# Patient Record
Sex: MALE | Race: BLACK OR AFRICAN AMERICAN | Employment: FULL TIME | URBAN - METROPOLITAN AREA
[De-identification: names, ages, dates, MRNs, and addresses within clinical notes are randomized per-mention and may not be internally consistent; named-entity substitution may affect disease eponyms.]

---

## 2017-01-30 DIAGNOSIS — K21.9 GASTROESOPHAGEAL REFLUX DISEASE WITHOUT ESOPHAGITIS: Primary | ICD-10-CM

## 2017-01-30 DIAGNOSIS — I10 HTN (HYPERTENSION): ICD-10-CM

## 2017-01-30 DIAGNOSIS — E11.9 DIABETES MELLITUS, TYPE 2 (H): Primary | ICD-10-CM

## 2017-01-30 DIAGNOSIS — E78.5 HYPERLIPEMIA: ICD-10-CM

## 2017-01-30 RX ORDER — ASPIRIN 325 MG
325 TABLET ORAL DAILY
Qty: 90 TABLET | Refills: 1 | Status: SHIPPED | OUTPATIENT
Start: 2017-01-30 | End: 2017-10-09

## 2017-01-30 NOTE — TELEPHONE ENCOUNTER
aspirin 325 MG tablet      Last Written Prescription Date:  Historical - patient report takes 1 daily     Last Office Visit : 7/5/2016  Future Office visit:  Not scheduled    Routing refill request to provider for review/approval because:  Medication is reported/historical and dose higher than standing order medication protocol

## 2017-01-30 NOTE — TELEPHONE ENCOUNTER
ranitidine (ZANTAC) 150 MG tablet      Last Written Prescription Date:  5/31/2016  Last Fill Quantity: 180,   # refills: 3  Last Office Visit : 7/5/2016  Future Office visit:  Not scheduled

## 2017-03-30 DIAGNOSIS — E78.5 HYPERLIPEMIA: ICD-10-CM

## 2017-03-30 DIAGNOSIS — K21.9 GASTROESOPHAGEAL REFLUX DISEASE WITHOUT ESOPHAGITIS: ICD-10-CM

## 2017-03-30 RX ORDER — SIMVASTATIN 40 MG
40 TABLET ORAL AT BEDTIME
Qty: 90 TABLET | Refills: 1 | Status: SHIPPED | OUTPATIENT
Start: 2017-03-30 | End: 2017-09-21

## 2017-03-30 NOTE — TELEPHONE ENCOUNTER
simvastatin  Last Written Prescription Date:  3/15/16  Last Fill Quantity: 90,   # refills: 3  Last Office Visit : 7/5/16  Future Office visit:  no

## 2017-05-15 ENCOUNTER — OFFICE VISIT (OUTPATIENT)
Dept: ENDOCRINOLOGY | Facility: CLINIC | Age: 67
End: 2017-05-15

## 2017-05-15 VITALS — HEIGHT: 70 IN

## 2017-05-15 DIAGNOSIS — E11.40 TYPE 2 DIABETES MELLITUS WITH DIABETIC NEUROPATHY, WITH LONG-TERM CURRENT USE OF INSULIN (H): Primary | ICD-10-CM

## 2017-05-15 DIAGNOSIS — Z79.4 TYPE 2 DIABETES MELLITUS WITH DIABETIC NEUROPATHY, WITH LONG-TERM CURRENT USE OF INSULIN (H): Primary | ICD-10-CM

## 2017-05-15 LAB — HBA1C MFR BLD: 8.5 % (ref 4.3–6)

## 2017-05-15 ASSESSMENT — PAIN SCALES - GENERAL: PAINLEVEL: NO PAIN (0)

## 2017-05-15 NOTE — PROGRESS NOTES
HPI:   Mr. Moore is here for follow up of type 2 diabetes.  He also has the problems listed below.  He usually sees Allie Jensen.  At his last visit with Alicia, he was struggling to afford his medications (bydureon and lantus).  She switched him to NPH and he is currently taking NPH 10 units in the morning (he has not been taking his evening dose) and regular insulin 4 units with each meal, but he is forgetting to take it at lunch often. He thinks it is working well and his blood sugars have been better.  He usually eats 2 toasts, eggs, small glass OJ.  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  He likes doing yardwork. He otherwise has no concerns today.    Past Medical History:   Diagnosis Date     Diabetes mellitus      Hypertension      Myocardial infarct (H)      PMH:   Type 2 diabetes since 2004.  HTN-   MI- in 1980's    No past surgical history on file.    Family History   Problem Relation Age of Onset     C.A.D. Mother      HEART DISEASE Mother      C.A.D. Father      DIABETES Father      Thyroid Disease Father      DIABETES Brother      C.A.D. Sister      HEART DISEASE Sister      Obesity Sister      Alcohol/Drug Brother      Circulatory Brother        Social History     Social History     Marital status:      Spouse name: N/A     Number of children: N/A     Years of education: N/A     Social History Main Topics     Smoking status: Former Smoker     Packs/day: 1.00     Years: 25.00     Types: Cigarettes     Quit date: 12/21/2004     Smokeless tobacco: Never Used     Alcohol use No     Drug use: No     Sexual activity: Yes     Partners: Female     Birth control/ protection: None     Other Topics Concern     Parent/Sibling W/ Cabg, Mi Or Angioplasty Before 65f 55m? Yes     Social History Narrative   Social Hx:  , no kids.  Works at [employer].     Current Outpatient Prescriptions   Medication     simvastatin (ZOCOR) 40 MG tablet     ranitidine (ZANTAC) 150 MG tablet  "    aspirin 325 MG tablet     hydrochlorothiazide (HYDRODIURIL) 25 MG tablet     insulin regular (HUMULIN R, NOVOLIN R) 100 UNIT/ML VIAL     insulin NPH (HUMULIN N, NOVOLIN N) 100 UNIT/ML VIAL     lisinopril (PRINIVIL,ZESTRIL) 20 MG tablet     ONE TOUCH ULTRA test strip     insulin pen needle (ULTICARE SHORT PEN NEEDLES) 31G X 8 MM MISC     No current facility-administered medications for this visit.         No Known Allergies    Physical Exam  BP (P) 132/90  Pulse (P) 92  Ht 1.778 m (5' 10\")  Wt (P) 93.4 kg (206 lb)  BMI (P) 29.56 kg/m2  GENERAL:  Alert and oriented X3, NAD, well dressed, answering questions appropriately, appears stated age.  EXTREMITIES: no edema, +pulses, no rashes, no lesions  RESULTS  Lab Results   Component Value Date    A1C 6.7 07/31/2013    A1C 7.0 04/30/2013    A1C 8.7 12/11/2012    A1C 13.4 09/25/2012    A1C 12.0 01/04/2011       TSH   Date Value Ref Range Status   11/25/2014 1.02 0.40 - 4.00 mU/L Final     Comment:     Effective 7/30/2014, the reference range for this assay has changed to reflect   new instrumentation/methodology.     09/27/2013 0.41 0.4 - 5.0 mU/L Final   03/28/2012 1.09 0.4 - 5.0 mU/L Final   01/04/2011 1.82 0.4 - 5.0 mU/L Final   01/04/2011 1.82 mcU/mL      T4 Free   Date Value Ref Range Status   11/25/2014 1.21 0.76 - 1.46 ng/dL Final     Comment:     Effective 7/30/2014, the reference range for this assay has changed to reflect   new instrumentation/methodology.     09/27/2013 0.92 0.70 - 1.85 ng/dL Final   03/28/2012 1.16 0.70 - 1.85 ng/dL Final   01/04/2011 1.09 0.70 - 1.85 ng/dL Final   09/01/2009 0.86 0.70 - 1.85 ng/dL Final       Creatinine   Date Value Ref Range Status   08/11/2015 1.51 (H) 0.66 - 1.25 mg/dL Final   04/16/2015 1.28 (H) 0.66 - 1.25 mg/dL Final   ]    No results found for: ALBUMIN]    ALT   Date Value Ref Range Status   11/25/2014 19 0 - 70 U/L Final   09/27/2013 21 0 - 70 U/L Final   ]    Recent Labs   Lab Test  04/16/15   0932  09/27/13   " 1101   CHOL  138  129   HDL  45  29*   LDL  69  68   TRIG  124  164*   CHOLHDLRATIO  3.1  4.5       No results found for: B12]    Assessment/Plan:     1.  Type 2 diabetes- Overall, doing quite well.  A1c has come down to 8.6%, from 12%.  Will make the following changes (instructions given to patient):     Take NPH (cloudy insulin) 10 units in the morning and 5 units at night.      Take Regular insulin (mealtime insulin) 4 units 30 minutes before eating.     Goal is to have most readings under 180.      Please call if you continue seeing numbers over 250 mg/dL.      A1c is down to 8.6%!    2.  Risk factors-     Retinopathy:  No.  Had eye exam within last year.   Nephropathy:  BP well controlled. No microalbuminuria.  Creatinine stable.   Neuropathy: No.    Feet: OK, no ulcers.   Taking ASA:   Lipids:  LDL at target.  Patient taking statin    3.  F/U in 3 months as scheduled with Alicia Jensen.     25/30 minute visit was spent counseling patient.     Tamara Garcia PA-C, MPAS   [facility]  Department of Medicine  Division of Endocrinology and Diabetes

## 2017-05-15 NOTE — MR AVS SNAPSHOT
After Visit Summary   5/15/2017    Harris Moore    MRN: [id number]           Patient Information     Date Of Birth          1950        Visit Information        Provider Department      5/15/2017 12:00 PM Tamara Garcia PA-C [facility]        Care Instructions    Take NPH (cloudy insulin) 10 units in the morning and 5 units at night.      Take Regular insulin (mealtime insulin) 4 units 30 minutes before eating.     Goal is to have most readings under 180.      Please call if you continue seeing numbers over 250 mg/dL.      A1c is down to 8.6%!!!            Follow-ups after your visit        Follow-up notes from your care team     Return in about 3 months (around 8/15/2017).      Your next 10 appointments already scheduled     Aug 21, 2017  1:00 PM CDT   (Arrive by 12:45 PM)   RETURN DIABETES with NORMA Noyola [facility] ([facility])    [address] [place] [id number] [phone]              Who to contact     Please call your clinic at [phone] to:    Ask questions about your health    Make or cancel appointments    Discuss your medicines    Learn about your test results    Speak to your doctor   If you have compliments or concerns about an experience at your clinic, or if you wish to file a complaint, please contact [facility] Patient Relations at [phone] or email us at [email or web address]         Additional Information About Your Visit        MyChart Information     [employer] is an electronic gateway that provides easy, online access to your medical records. With [employer], you can request a clinic appointment, read your test results, renew a prescription or communicate with your care team.     To sign up for [employer] visit the website at [email or web address]   You will be asked to enter the access code listed below, as well as some personal  "information. Please follow the directions to create your username and password.     Your access code is: [email or web address]  Expires: 2017 12:50 PM     Your access code will  in 90 days. If you need help or a new code, please contact your [facility] or call [phone] for assistance.        Care EveryWhere ID     This is your Care EveryWhere ID. This could be used by other organizations to access your [facility] medical records  [id number]        Your Vitals Were     Height                   1.778 m (5' 10\")            Blood Pressure from Last 3 Encounters:   05/15/17 (P) 132/90   16 111/70   02/10/16 120/85    Weight from Last 3 Encounters:   05/15/17 (P) 93.4 kg (206 lb)   16 86.1 kg (189 lb 12.8 oz)   02/10/16 92.3 kg (203 lb 6.4 oz)              Today, you had the following     No orders found for display       Primary Care Provider Office Phone # Fax #    Sally Colby PA-C [phone]        [facility] [address] [place] [id number]        Thank you!     Thank you for choosing [facility]  for your care. Our goal is always to provide you with excellent care. Hearing back from our patients is one way we can continue to improve our services. Please take a few minutes to complete the written survey that you may receive in the mail after your visit with us. Thank you!             Your Updated Medication List - Protect others around you: Learn how to safely use, store and throw away your medicines at www.disposemymeds.org.          This list is accurate as of: 5/15/17 12:50 PM.  Always use your most recent med list.                   Brand Name Dispense Instructions for use    aspirin 325 MG tablet     90 tablet    Take 1 tablet (325 mg) by mouth daily       hydrochlorothiazide 25 MG tablet    HYDRODIURIL    90 tablet    Take 1 tablet (25 mg) by mouth daily       insulin  UNIT/ML injection    HumuLIN " N/NovoLIN N    10 mL    10 units before breakfast and again before bedtime.       insulin pen needle 31G X 8 MM    ULTICARE SHORT    360 each    Use 4 daily or as directed.       insulin regular 100 UNIT/ML injection    HumuLIN R/NovoLIN R    10 mL    4 units before each meal       lisinopril 20 MG tablet    PRINIVIL/ZESTRIL    90 tablet    Take 1 tablet (20 mg) by mouth daily       ONE TOUCH ULTRA test strip   Generic drug:  blood glucose monitoring     4 Box    Test 4 times daily       ranitidine 150 MG tablet    ZANTAC    180 tablet    Take 1 tablet (150 mg) by mouth 2 times daily       simvastatin 40 MG tablet    ZOCOR    90 tablet    Take 1 tablet (40 mg) by mouth At Bedtime

## 2017-05-15 NOTE — NURSING NOTE
Chief Complaint   Patient presents with     RECHECK     F/U TYPE II DM     Zaida Banerjee, CMA  Endocrinology & Diabetes 3G    Capillary Fingerstick performed for an Hemoglobin A1C test

## 2017-05-15 NOTE — LETTER
5/15/2017       RE: Harris Moore  [address] [place] [address]     Dear Colleague,    Thank you for referring your patient, Harris Moore, to the [facility] at [facility]. Please see a copy of my visit note below.    HPI:   Mr. Moore is here for follow up of type 2 diabetes.  He also has the problems listed below.  He usually sees lAlie Jensen.  At his last visit with Alicia, he was struggling to afford his medications (bydureon and lantus).  She switched him to NPH and he is currently taking NPH 10 units in the morning (he has not been taking his evening dose) and regular insulin 4 units with each meal, but he is forgetting to take it at lunch often. He thinks it is working well and his blood sugars have been better.  He usually eats 2 toasts, eggs, small glass OJ.  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 He otherwise has no concerns today.    Past Medical History:   Diagnosis Date     Diabetes mellitus      Hypertension      Myocardial infarct (H)      PMH:   Type 2 diabetes since 2004.  HTN-   MI- in 1980's    No past surgical history on file.    Family History   Problem Relation Age of Onset     C.A.D. Mother      HEART DISEASE Mother      C.A.D. Father      DIABETES Father      Thyroid Disease Father      DIABETES Brother      C.A.D. Sister      HEART DISEASE Sister      Obesity Sister      Alcohol/Drug Brother      Circulatory Brother        Social History     Social History     Marital status:      Spouse name: N/A     Number of children: N/A     Years of education: N/A     Social History Main Topics     Smoking status: Former Smoker     Packs/day: 1.00     Years: 25.00     Types: Cigarettes     Quit date: 12/21/2004     Smokeless tobacco: Never Used     Alcohol use No     Drug use: No     Sexual activity: Yes     Partners: Female     Birth control/ protection: None     Other Topics  "Concern     Parent/Sibling W/ Cabg, Mi Or Angioplasty Before 65f 55m? Yes     Social History Narrative   Social Hx:  , no kids.  Works at [employer].     Current Outpatient Prescriptions   Medication     simvastatin (ZOCOR) 40 MG tablet     ranitidine (ZANTAC) 150 MG tablet     aspirin 325 MG tablet     hydrochlorothiazide (HYDRODIURIL) 25 MG tablet     insulin regular (HUMULIN R, NOVOLIN R) 100 UNIT/ML VIAL     insulin NPH (HUMULIN N, NOVOLIN N) 100 UNIT/ML VIAL     lisinopril (PRINIVIL,ZESTRIL) 20 MG tablet     ONE TOUCH ULTRA test strip     insulin pen needle (ULTICARE SHORT PEN NEEDLES) 31G X 8 MM MISC     No current facility-administered medications for this visit.         No Known Allergies    Physical Exam  BP (P) 132/90  Pulse (P) 92  Ht 1.778 m (5' 10\")  Wt (P) 93.4 kg (206 lb)  BMI (P) 29.56 kg/m2  GENERAL:  Alert and oriented X3, NAD, well dressed, answering questions appropriately, appears stated age.  EXTREMITIES: no edema, +pulses, no rashes, no lesions  RESULTS  Lab Results   Component Value Date    A1C 6.7 07/31/2013    A1C 7.0 04/30/2013    A1C 8.7 12/11/2012    A1C 13.4 09/25/2012    A1C 12.0 01/04/2011       TSH   Date Value Ref Range Status   11/25/2014 1.02 0.40 - 4.00 mU/L Final     Comment:     Effective 7/30/2014, the reference range for this assay has changed to reflect   new instrumentation/methodology.     09/27/2013 0.41 0.4 - 5.0 mU/L Final   03/28/2012 1.09 0.4 - 5.0 mU/L Final   01/04/2011 1.82 0.4 - 5.0 mU/L Final   01/04/2011 1.82 mcU/mL      T4 Free   Date Value Ref Range Status   11/25/2014 1.21 0.76 - 1.46 ng/dL Final     Comment:     Effective 7/30/2014, the reference range for this assay has changed to reflect   new instrumentation/methodology.     09/27/2013 0.92 0.70 - 1.85 ng/dL Final   03/28/2012 1.16 0.70 - 1.85 ng/dL Final   01/04/2011 1.09 0.70 - 1.85 ng/dL Final   09/01/2009 0.86 0.70 - 1.85 ng/dL Final       Creatinine   Date Value Ref Range Status "   08/11/2015 1.51 (H) 0.66 - 1.25 mg/dL Final   04/16/2015 1.28 (H) 0.66 - 1.25 mg/dL Final   ]    No results found for: ALBUMIN]    ALT   Date Value Ref Range Status   11/25/2014 19 0 - 70 U/L Final   09/27/2013 21 0 - 70 U/L Final   ]    Recent Labs   Lab Test  04/16/15   0932  09/27/13   1101   CHOL  138  129   HDL  45  29*   LDL  69  68   TRIG  124  164*   CHOLHDLRATIO  3.1  4.5       No results found for: B12]    Assessment/Plan:     1.  Type 2 diabetes- Overall, doing quite well.  A1c has come down to 8.6%, from 12%.  Will make the following changes (instructions given to patient):     Take NPH (cloudy insulin) 10 units in the morning and 5 units at night.      Take Regular insulin (mealtime insulin) 4 units 30 minutes before eating.     Goal is to have most readings under 180.      Please call if you continue seeing numbers over 250 mg/dL.      A1c is down to 8.6%!    2.  Risk factors-     Retinopathy:  No.  Had eye exam within last year.   Nephropathy:  BP well controlled. No microalbuminuria.  Creatinine stable.   Neuropathy: No.    Feet: OK, no ulcers.   Taking ASA:   Lipids:  LDL at target.  Patient taking statin    3.  F/U in 3 months as scheduled with Alicia Jensen.     25/30 minute visit was spent counseling patient.     Tamara Garcia PA-C, MPAS   [facility]  Department of Medicine  Division of Endocrinology and Diabetes

## 2017-05-23 ENCOUNTER — TELEPHONE (OUTPATIENT)
Dept: ENDOCRINOLOGY | Facility: CLINIC | Age: 67
End: 2017-05-23

## 2017-05-23 NOTE — TELEPHONE ENCOUNTER
Pt at [facility] ED over weekend, kept for observation after lip/face swelling, no trouble breathing/swallowing. Given Epipen, steroid shot. Told had angioedema, could have been caused by lisinopril, aspirin. Please advise at [phone]. DVM fine. Sent to [facility].

## 2017-05-26 NOTE — TELEPHONE ENCOUNTER
Spoke with spouse and relayd provider message below, verbalized understandings,        ----- Message -----     From: Allie Jensen PA-C     Sent: 5/23/2017  11:48 AM       To: Elena Zelaya RN  Subject: RE: Pt in hospital over the weekend              He should hold both meds Lisinopril and ASA for now and check his BP at home and call with BP readings in a few days.  He can remain on HCTZ.  Thanks adilson   ----- Message -----     From: Elena Zelaya RN     Sent: 5/23/2017  10:19 AM       To: Allie Jensen PA-C  Subject: Pt in hospital over the weekend                  Pt at [facility] ED over weekend, kept for observation after lip/face swelling, no trouble breathing/swallowing. Given Epipen, steroid shot. Told had angioedema, could have been caused by lisinopril, aspirin. Please advise at [phone]. DVM roym  Thanks,   Elena ESCOBEDO

## 2017-07-07 DIAGNOSIS — K21.9 GASTROESOPHAGEAL REFLUX DISEASE WITHOUT ESOPHAGITIS: ICD-10-CM

## 2017-07-10 NOTE — TELEPHONE ENCOUNTER
ranitidine        Last Written Prescription Date:  1/30/17  Last Fill Quantity: 180,   # refills: 1  Last Office Visit : 5/15/17  Future Office visit:  8/21/17

## 2017-07-12 DIAGNOSIS — E11.9 DM TYPE 2 (DIABETES MELLITUS, TYPE 2) (H): ICD-10-CM

## 2017-07-12 DIAGNOSIS — I10 HYPERTENSION GOAL BP (BLOOD PRESSURE) < 140/90: Primary | ICD-10-CM

## 2017-07-12 RX ORDER — LISINOPRIL 20 MG/1
20 TABLET ORAL DAILY
Qty: 90 TABLET | Refills: 3 | Status: SHIPPED | OUTPATIENT
Start: 2017-07-12 | End: 2017-10-09

## 2017-07-12 NOTE — TELEPHONE ENCOUNTER
lisinopril (PRINIVIL,ZESTRIL) 20 MG tablet      Last Written Prescription Date:  4/11/2016  Last Fill Quantity: 90,   # refills: 3  Last Office Visit : 5/15/2017  Future Office visit:  8/21/2017

## 2017-09-21 DIAGNOSIS — E78.5 HYPERLIPEMIA: ICD-10-CM

## 2017-09-22 RX ORDER — SIMVASTATIN 40 MG
40 TABLET ORAL AT BEDTIME
Qty: 90 TABLET | Refills: 3 | Status: SHIPPED | OUTPATIENT
Start: 2017-09-22

## 2017-09-22 NOTE — TELEPHONE ENCOUNTER
simvastatin  40 MG       Last Written Prescription Date:  3/30/17  Last Fill Quantity: 90,   # refills: 1  Last Office Visit : 5/15/17  Future Office visit:  10/9/17     }

## 2017-09-23 DIAGNOSIS — E11.9 TYPE 2 DIABETES MELLITUS WITHOUT COMPLICATIONS (H): ICD-10-CM

## 2017-09-23 NOTE — TELEPHONE ENCOUNTER
"Received refill request for Novolin N, inject 10 units SQ before breakfast and bedtime (last filled by Allie Jensen).    Last office visit with Tamara Garcia on 5/15/17. Upcoming office visit with Tamara on 10/9/17.          Per 5/15/17 progress note from Tamara Garcia:    \"1. Type 2 diabetes- Overall, doing quite well.  A1c has come down to 8.6%, from 12%.  Will make the following changes (instructions given to patient):      Take NPH (cloudy insulin) 10 units in the morning and 5 units at night.       Take Regular insulin (mealtime insulin) 4 units 30 minutes before eating.\"        In Tamara's absence will send to Allie Jensen for change in prescription dosing.       Kalani Bonner, RN  Endocrine Care Coordinator  [facility]     "

## 2017-09-25 DIAGNOSIS — I10 HTN (HYPERTENSION): Primary | ICD-10-CM

## 2017-09-25 RX ORDER — HYDROCHLOROTHIAZIDE 25 MG/1
25 TABLET ORAL DAILY
Qty: 90 TABLET | Refills: 2 | Status: SHIPPED | OUTPATIENT
Start: 2017-09-25 | End: 2018-09-03

## 2017-09-25 NOTE — TELEPHONE ENCOUNTER
dominiciuril  Last Written Prescription Date:  12/5/16  Last Fill Quantity: 90,   # refills: 2  Last Office Visit : 5/15/17  Future Office visit:  10/9/17

## 2017-09-28 DIAGNOSIS — E11.9 TYPE 2 DIABETES MELLITUS WITHOUT COMPLICATIONS (H): ICD-10-CM

## 2017-10-09 ENCOUNTER — OFFICE VISIT (OUTPATIENT)
Dept: ENDOCRINOLOGY | Facility: CLINIC | Age: 67
End: 2017-10-09

## 2017-10-09 VITALS — HEIGHT: 70 IN

## 2017-10-09 DIAGNOSIS — E11.65 POORLY CONTROLLED TYPE 2 DIABETES MELLITUS (H): ICD-10-CM

## 2017-10-09 DIAGNOSIS — E11.65 POORLY CONTROLLED TYPE 2 DIABETES MELLITUS (H): Primary | ICD-10-CM

## 2017-10-09 LAB
CREAT SERPL-MCNC: 1.22 MG/DL (ref 0.66–1.25)
CREAT UR-MCNC: 200 MG/DL
GFR SERPL CREATININE-BSD FRML MDRD: 59 ML/MIN/1.7M2
HBA1C MFR BLD: 8.3 % (ref 4.3–6)
MICROALBUMIN UR-MCNC: 36 MG/L
MICROALBUMIN/CREAT UR: 17.8 MG/G CR (ref 0–17)
VIT B12 SERPL-MCNC: 413 PG/ML (ref 193–986)

## 2017-10-09 ASSESSMENT — PAIN SCALES - GENERAL: PAINLEVEL: NO PAIN (0)

## 2017-10-09 NOTE — PATIENT INSTRUCTIONS
Take NPH (cloudy insulin) 10 units in the morning and 8 units at night.      A1c is down to 8.3%!  Keep up the great work!    I will get in touch with you about your labs. We may be able to restart Metformin.     Let me know if you get the cortisone shot and we will adjust your insulin.

## 2017-10-09 NOTE — LETTER
10/9/2017       RE: Harris Moore  [address]  [place] [address]     Dear Colleague,    Thank you for referring your patient, Harris Moore, to the [facility] ENDOCRINOLOGY at [facility]. Please see a copy of my visit note below.    HPI:   Mr. Moore is here for follow up of type 2 diabetes.  He also has the problems listed below.  He also has seen Allie Jensen in the past.  He previously had been struggling to afford his medications (bydureon and lantus), so he was switched to NPH and he is currently taking NPH 10 units in the morning and 5 in the PM, Regular insulin 4-5 units with each meal (twice a day, 3 times a week).  He thinks it is working well and his blood sugars have been better.  He usually eats 2 toasts, eggs, small glass OJ.  He works at the [employer] casually. 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  He does yardwork.    His right knee has been painful.  He is considering a cortisone shot. He otherwise has no concerns today.    Past Medical History:   Diagnosis Date     Diabetes mellitus      Hypertension      Myocardial infarct      PMH:   Type 2 diabetes since 2004.  HTN-   MI- in 1980's    No past surgical history on file.    Family History   Problem Relation Age of Onset     C.A.D. Mother      HEART DISEASE Mother      C.A.D. Father      DIABETES Father      Thyroid Disease Father      DIABETES Brother      C.A.D. Sister      HEART DISEASE Sister      Obesity Sister      Alcohol/Drug Brother      Circulatory Brother        Social History     Social History     Marital status:      Spouse name: N/A     Number of children: N/A     Years of education: N/A     Social History Main Topics     Smoking status: Former Smoker     Packs/day: 1.00     Years: 25.00     Types: Cigarettes     Quit date: 12/21/2004     Smokeless tobacco: Never Used     Alcohol use No     Drug use: No     Sexual activity: Yes     Partners:  "Female     Birth control/ protection: None     Other Topics Concern     Parent/Sibling W/ Cabg, Mi Or Angioplasty Before 65f 55m? Yes     Social History Narrative   Social Hx:  , no kids.  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     Current Outpatient Prescriptions   Medication     Acetaminophen 325 MG CAPS     insulin NPH (HUMULIN N/NOVOLIN N) 100 UNIT/ML injection     insulin regular (HUMULIN R/NOVOLIN R) 100 UNIT/ML injection     hydrochlorothiazide (HYDRODIURIL) 25 MG tablet     simvastatin (ZOCOR) 40 MG tablet     ranitidine (ZANTAC) 150 MG tablet     ONE TOUCH ULTRA test strip     insulin pen needle (ULTICARE SHORT PEN NEEDLES) 31G X 8 MM MISC     No current facility-administered medications for this visit.         No Known Allergies    Physical Exam  BP (!) (P) 135/96  Pulse (P) 96  Ht 1.778 m (5' 10\")  Wt (P) 93.4 kg (206 lb)  BMI (P) 29.56 kg/m2  GENERAL:  Alert and oriented X3, NAD, well dressed, answering questions appropriately, appears stated age.  EXTREMITIES: no edema, +pulses, no rashes, no lesions    RESULTS  Lab Results   Component Value Date    A1C 6.7 07/31/2013    A1C 7.0 04/30/2013    A1C 8.7 12/11/2012    A1C 13.4 09/25/2012    A1C 12.0 01/04/2011       TSH   Date Value Ref Range Status   11/25/2014 1.02 0.40 - 4.00 mU/L Final     Comment:     Effective 7/30/2014, the reference range for this assay has changed to reflect   new instrumentation/methodology.     09/27/2013 0.41 0.4 - 5.0 mU/L Final   03/28/2012 1.09 0.4 - 5.0 mU/L Final   01/04/2011 1.82 0.4 - 5.0 mU/L Final   01/04/2011 1.82 mcU/mL      T4 Free   Date Value Ref Range Status   11/25/2014 1.21 0.76 - 1.46 ng/dL Final     Comment:     Effective 7/30/2014, the reference range for this assay has changed to reflect   new instrumentation/methodology.     09/27/2013 0.92 0.70 - 1.85 ng/dL Final   03/28/2012 1.16 0.70 - 1.85 ng/dL Final   01/04/2011 1.09 0.70 - 1.85 ng/dL Final   09/01/2009 0.86 0.70 - 1.85 ng/dL Final "       Creatinine   Date Value Ref Range Status   08/11/2015 1.51 (H) 0.66 - 1.25 mg/dL Final   04/16/2015 1.28 (H) 0.66 - 1.25 mg/dL Final   ]    No results found for: ALBUMIN]    ALT   Date Value Ref Range Status   11/25/2014 19 0 - 70 U/L Final   09/27/2013 21 0 - 70 U/L Final   ]    Recent Labs   Lab Test  04/16/15   0932  09/27/13   1101   CHOL  138  129   HDL  45  29*   LDL  69  68   TRIG  124  164*   CHOLHDLRATIO  3.1  4.5       No results found for: B12]      Assessment/Plan:     1.  Type 2 diabetes- Overall, doing quite well.  A1c has come down to 8.3% from 8.6%, from 12%.  Will check creatinine.  If ok, will plan to restart Metformin.  In the meantime, make the following changes (instructions given to patient):     Take NPH (cloudy insulin) 10 units in the morning and 8 units at night.      2.  Risk factors-     Retinopathy:  No.  Had eye exam within last year.   Nephropathy:  BP well controlled. No microalbuminuria.  Creatinine stable.  Has a history of angioedema with lisinopril.  If needed in the future, will use an ARB.   Neuropathy: Some numbness in feet.     Feet: OK, no ulcers.   Taking ASA: Yes  Lipids:  LDL at target.  Patient taking statin    3.  F/U in 3 months.     I spent 30 minutes with this patient face to face and explained the conditions and plans (more than 50% of time was counseling/coordination of care, diabetes management, follow up plan for worsening hyper and hypoglycemia) . The patient understood and is satisfied with today's visit.       Tamara Garcia PA-C, MPAS   [facility]  Department of Medicine  Division of Endocrinology and Diabetes      10:40am- creatinine came back normal, so will restart Metformin 500 mg once daily x 1 week, then increase to 500 mg bid.  Send patient a [employer] message.   Tamara Garcia PA-C

## 2017-10-09 NOTE — MR AVS SNAPSHOT
After Visit Summary   10/9/2017    Harris Moore    MRN: [id number]           Patient Information     Date Of Birth          1950        Visit Information        Provider Department      10/9/2017 8:00 AM Tamara Garcia PA-C [facility]        Today's Diagnoses     Poorly controlled type 2 diabetes mellitus (H)    -  1      Care Instructions    Take NPH (cloudy insulin) 10 units in the morning and 8 units at night.      A1c is down to 8.3%!  Keep up the great work!    I will get in touch with you about your labs. We may be able to restart Metformin.     Let me know if you get the cortisone shot and we will adjust your insulin.            Follow-ups after your visit        Follow-up notes from your care team     Return in about 3 months (around 1/9/2018).      Your next 10 appointments already scheduled     Oct 09, 2017  9:45 AM CDT   LAB with  LAB   [facility] ([facility])    [address] [place] [id number] [phone]           Patient must bring picture ID. Patient should be prepared to give a urine specimen  Please do not eat 10-12 hours before your appointment if you are coming in fasting for labs on lipids, cholesterol, or glucose (sugar). Pregnant women should follow their Care Team instructions. Water with medications is okay. Do not drink coffee or other fluids. If you have concerns about taking  your medications, please ask at office or if scheduling via [facility], send a message by clicking on Secure Messaging, Message Your Care Team.            Jan 08, 2018  9:30 AM CST   (Arrive by 9:15 AM)   RETURN DIABETES with NORMA Noyola [facility] ([facility])    [address] [place] [id number] [phone]              Future tests that were ordered for you today     Open Future Orders        Priority Expected Expires Ordered    Vitamin B12 Routine   "10/10/2018 10/9/2017    Creatinine Routine  10/9/2018 10/9/2017            Who to contact     Please call your clinic at [phone] to:    Ask questions about your health    Make or cancel appointments    Discuss your medicines    Learn about your test results    Speak to your doctor   If you have compliments or concerns about an experience at your clinic, or if you wish to file a complaint, please contact [facility] Patient Relations at [phone] or email us at [email or web address]         Additional Information About Your Visit        [employer] Information     [employer] is an electronic gateway that provides easy, online access to your medical records. With [employer], you can request a clinic appointment, read your test results, renew a prescription or communicate with your care team.     To sign up for [employer] visit the website at [email or web address]   You will be asked to enter the access code listed below, as well as some personal information. Please follow the directions to create your username and password.     Your access code is: [email or web address]  Expires: 2018  8:54 AM     Your access code will  in 90 days. If you need help or a new code, please contact your [facility] or call [phone] for assistance.        Care EveryWhere ID     This is your Care EveryWhere ID. This could be used by other organizations to access your [place] medical records  [id number]        Your Vitals Were     Height                   1.778 m (5' 10\")            Blood Pressure from Last 3 Encounters:   10/09/17 (!) (P) 135/96   05/15/17 (P) 132/90   16 111/70    Weight from Last 3 Encounters:   10/09/17 (P) 93.4 kg (206 lb)   05/15/17 (P) 93.4 kg (206 lb)   16 86.1 kg (189 lb 12.8 oz)              We Performed the Following     Albumin Random Urine Quantitative with Creat Ratio          Today's Medication Changes          These changes " are accurate as of: 10/9/17  8:58 AM.  If you have any questions, ask your nurse or doctor.               These medicines have changed or have updated prescriptions.        Dose/Directions    insulin  UNIT/ML injection   Commonly known as:  HumuLIN N/NovoLIN N   This may have changed:  additional instructions   Used for:  Poorly controlled type 2 diabetes mellitus (H)   Changed by:  Tamara Garcia PA-C        10 units before breakfast and 8 units before bedtime.   Quantity:  10 mL   Refills:  3         Stop taking these medicines if you haven't already. Please contact your care team if you have questions.     aspirin 325 MG tablet   Stopped by:  Tamara Garcia PA-C           lisinopril 20 MG tablet   Commonly known as:  PRINIVIL/ZESTRIL   Stopped by:  Tamara Garcia PA-C                    Primary Care Provider Office Phone # Fax #    Sally Nas Colby PA-C [phone]       [address] [facility] [address]  [facility] [address]        Equal Access to Services     [facility]: Hadii aad ku hadasho Soomaali, waaxda luqadaha, qaybta kaalmada adeegyada, waxay idiin hayjaquelinen coral sloan . So [facility] [phone].    ATENCIÓN: Si habla español, tiene a downing disposición servicios gratuitos de asistencia lingüística. Ll[facility] [phone].    We comply with applicable federal civil rights laws and [place] laws. We do not discriminate on the basis of race, color, national origin, age, disability, sex, sexual orientation, or gender identity.            Thank you!     Thank you for choosing [facility] ENDOCRINOLOGY  for your care. Our goal is always to provide you with excellent care. Hearing back from our patients is one way we can continue to improve our services. Please take a few minutes to complete the written survey that you may receive in the mail after your visit with us. Thank you!             Your Updated Medication List - Protect others around you: Learn how to safely use, store and throw  away your medicines at www.disposemymeds.org.          This list is accurate as of: 10/9/17  8:58 AM.  Always use your most recent med list.                   Brand Name Dispense Instructions for use Diagnosis    Acetaminophen 325 MG Caps           hydrochlorothiazide 25 MG tablet    HYDRODIURIL    90 tablet    Take 1 tablet (25 mg) by mouth daily    HTN (hypertension)       insulin  UNIT/ML injection    HumuLIN N/NovoLIN N    10 mL    10 units before breakfast and 8 units before bedtime.    Poorly controlled type 2 diabetes mellitus (H)       insulin pen needle 31G X 8 MM    ULTICARE SHORT    360 each    Use 4 daily or as directed.    Type 2 diabetes, HbA1C goal < 8% (H)       insulin regular 100 UNIT/ML injection    HumuLIN R/NovoLIN R    10 mL    4 units before each meal    Type 2 diabetes mellitus without complications (H)       ONE TOUCH ULTRA test strip   Generic drug:  blood glucose monitoring     4 Box    Test 4 times daily    DM type 2 (diabetes mellitus, type 2) (H)       ranitidine 150 MG tablet    ZANTAC    180 tablet    Take 1 tablet (150 mg) by mouth 2 times daily    Gastroesophageal reflux disease without esophagitis       simvastatin 40 MG tablet    ZOCOR    90 tablet    Take 1 tablet (40 mg) by mouth At Bedtime    Hyperlipemia

## 2018-01-18 ENCOUNTER — TELEPHONE (OUTPATIENT)
Dept: FAMILY MEDICINE | Facility: CLINIC | Age: 68
End: 2018-01-18

## 2018-01-18 NOTE — TELEPHONE ENCOUNTER
Patient calling to schedule an appt, last visit was 2 years ago, chart reviewed, mild medical complexity, no MH issues, hx of addiction sober since 2003, no chronic pain, patient no longer qualifies for the [facility] clinic, please help him establish care in another primary care clinic    Alicia Apodaca P  MEDICAL LEAD

## 2018-01-19 NOTE — TELEPHONE ENCOUNTER
Writer attempted to reach pt no answer, LVM for a call back to review message below and help pt establish care with a new PCP.      Russell Sharif

## 2018-09-03 DIAGNOSIS — K21.9 GASTROESOPHAGEAL REFLUX DISEASE WITHOUT ESOPHAGITIS: ICD-10-CM

## 2018-09-03 DIAGNOSIS — I10 HTN (HYPERTENSION): ICD-10-CM

## 2018-09-04 RX ORDER — HYDROCHLOROTHIAZIDE 25 MG/1
TABLET ORAL
Qty: 90 TABLET | Refills: 0 | Status: SHIPPED | OUTPATIENT
Start: 2018-09-04

## 2018-09-04 NOTE — TELEPHONE ENCOUNTER
hydrochlorothiazide     Last Written Prescription Date:  9-25-17  Last Fill Quantity: 90,   # refills: 2  Last Office Visit : 10-9-17  Future Office visit:  none    Routing refill request to provider for review/approval because:  Drug failed the [facility] or [facility] refill protocol.  Care everywhere labs 5-20-17:  K 4.3,    Na 135 ( abnormal)

## 2020-03-29 NOTE — PROGRESS NOTES
HPI:   Mr. Moore is here for follow up of type 2 diabetes.  He also has the problems listed below.  He also has seen Allie Jensen in the past.  He previously had been struggling to afford his medications (bydureon and lantus), so he was switched to NPH and he is currently taking NPH 10 units in the morning and 5 in the PM, Regular insulin 4-5 units with each meal (twice a day, 3 times a week).  He thinks it is working well and his blood sugars have been better.  He usually eats 2 toasts, eggs, small glass OJ.  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    His right knee has been painful.  He is considering a cortisone shot. He otherwise has no concerns today.    Past Medical History:   Diagnosis Date     Diabetes mellitus      Hypertension      Myocardial infarct      PMH:   Type 2 diabetes since 2004.  HTN-   MI- in 1980's    No past surgical history on file.    Family History   Problem Relation Age of Onset     C.A.D. Mother      HEART DISEASE Mother      C.A.D. Father      DIABETES Father      Thyroid Disease Father      DIABETES Brother      C.A.D. Sister      HEART DISEASE Sister      Obesity Sister      Alcohol/Drug Brother      Circulatory Brother        Social History     Social History     Marital status:      Spouse name: N/A     Number of children: N/A     Years of education: N/A     Social History Main Topics     Smoking status: Former Smoker     Packs/day: 1.00     Years: 25.00     Types: Cigarettes     Quit date: 12/21/2004     Smokeless tobacco: Never Used     Alcohol use No     Drug use: No     Sexual activity: Yes     Partners: Female     Birth control/ protection: None     Other Topics Concern     Parent/Sibling W/ Cabg, Mi Or Angioplasty Before 65f 55m? Yes     Social History Narrative   Social Hx:  , no kids.  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     Current Outpatient Prescriptions  "  Medication     Acetaminophen 325 MG CAPS     insulin NPH (HUMULIN N/NOVOLIN N) 100 UNIT/ML injection     insulin regular (HUMULIN R/NOVOLIN R) 100 UNIT/ML injection     hydrochlorothiazide (HYDRODIURIL) 25 MG tablet     simvastatin (ZOCOR) 40 MG tablet     ranitidine (ZANTAC) 150 MG tablet     ONE TOUCH ULTRA test strip     insulin pen needle (ULTICARE SHORT PEN NEEDLES) 31G X 8 MM MISC     No current facility-administered medications for this visit.         No Known Allergies    Physical Exam  BP (!) (P) 135/96  Pulse (P) 96  Ht 1.778 m (5' 10\")  Wt (P) 93.4 kg (206 lb)  BMI (P) 29.56 kg/m2  GENERAL:  Alert and oriented X3, NAD, well dressed, answering questions appropriately, appears stated age.  EXTREMITIES: no edema, +pulses, no rashes, no lesions    RESULTS  Lab Results   Component Value Date    A1C 6.7 07/31/2013    A1C 7.0 04/30/2013    A1C 8.7 12/11/2012    A1C 13.4 09/25/2012    A1C 12.0 01/04/2011       TSH   Date Value Ref Range Status   11/25/2014 1.02 0.40 - 4.00 mU/L Final     Comment:     Effective 7/30/2014, the reference range for this assay has changed to reflect   new instrumentation/methodology.     09/27/2013 0.41 0.4 - 5.0 mU/L Final   03/28/2012 1.09 0.4 - 5.0 mU/L Final   01/04/2011 1.82 0.4 - 5.0 mU/L Final   01/04/2011 1.82 mcU/mL      T4 Free   Date Value Ref Range Status   11/25/2014 1.21 0.76 - 1.46 ng/dL Final     Comment:     Effective 7/30/2014, the reference range for this assay has changed to reflect   new instrumentation/methodology.     09/27/2013 0.92 0.70 - 1.85 ng/dL Final   03/28/2012 1.16 0.70 - 1.85 ng/dL Final   01/04/2011 1.09 0.70 - 1.85 ng/dL Final   09/01/2009 0.86 0.70 - 1.85 ng/dL Final       Creatinine   Date Value Ref Range Status   08/11/2015 1.51 (H) 0.66 - 1.25 mg/dL Final   04/16/2015 1.28 (H) 0.66 - 1.25 mg/dL Final   ]    No results found for: ALBUMIN]    ALT   Date Value Ref Range Status   11/25/2014 19 0 - 70 U/L Final   09/27/2013 21 0 - 70 U/L Final "   ]    Recent Labs   Lab Test  04/16/15   0932  09/27/13   1101   CHOL  138  129   HDL  45  29*   LDL  69  68   TRIG  124  164*   CHOLHDLRATIO  3.1  4.5       No results found for: B12]      Assessment/Plan:     1.  Type 2 diabetes- Overall, doing quite well.  A1c has come down to 8.3% from 8.6%, from 12%.  Will check creatinine.  If ok, will plan to restart Metformin.  In the meantime, make the following changes (instructions given to patient):     Take NPH (cloudy insulin) 10 units in the morning and 8 units at night.      2.  Risk factors-     Retinopathy:  No.  Had eye exam within last year.   Nephropathy:  BP well controlled. No microalbuminuria.  Creatinine stable.  Has a history of angioedema with lisinopril.  If needed in the future, will use an ARB.   Neuropathy: Some numbness in feet.     Feet: OK, no ulcers.   Taking ASA: Yes  Lipids:  LDL at target.  Patient taking statin    3.  F/U in 3 months.     I spent 30 minutes with this patient face to face and explained the conditions and plans (more than 50% of time was counseling/coordination of care, diabetes management, follow up plan for worsening hyper and hypoglycemia) . The patient understood and is satisfied with today's visit.       Tamara Garcia PA-C, MPAS   [facility]  Department of Medicine  Division of Endocrinology and Diabetes      10:40am- creatinine came back normal, so will restart Metformin 500 mg once daily x 1 week, then increase to 500 mg bid.  Send patient a [employer] message.   Tamara Garcia PA-C   no